# Patient Record
(demographics unavailable — no encounter records)

---

## 2024-10-09 NOTE — HISTORY OF PRESENT ILLNESS
[Breast milk] : breast milk [Formula ___ oz/feed] : [unfilled] oz of formula per feed [Normal] : Normal [In Bassinet/Crib] : sleeps in bassinet/crib [Pacifier use] : Pacifier use [No] : No cigarette smoke exposure [Water heater temperature set at <120 degrees F] : Water heater temperature set at <120 degrees F [Rear facing car seat in back seat] : Rear facing car seat in back seat [Carbon Monoxide Detectors] : Carbon monoxide detectors at home [Smoke Detectors] : Smoke detectors at home. [NO] : No [At risk for exposure to TB] : Not at risk for exposure to Tuberculosis  [de-identified] : refuses bottle [FreeTextEntry1] : doing well recent URI all ok

## 2024-10-09 NOTE — PHYSICAL EXAM
[Alert] : alert [Normocephalic] : normocephalic [Flat Open Anterior [place]] : flat open anterior fontanelle [PERRL] : PERRL [Red Reflex Bilateral] : red reflex bilateral [Normally Placed Ears] : normally placed ears [Auricles Well Formed] : auricles well formed [Clear Tympanic membranes] : clear tympanic membranes [Light reflex present] : light reflex present [Bony landmarks visible] : bony landmarks visible [Nares Patent] : nares patent [Palate Intact] : palate intact [Uvula Midline] : uvula midline [Supple, full passive range of motion] : supple, full passive range of motion [Symmetric Chest Rise] : symmetric chest rise [Clear to Auscultation Bilaterally] : clear to auscultation bilaterally [Regular Rate and Rhythm] : regular rate and rhythm [S1, S2 present] : S1, S2 present [+2 Femoral Pulses] : +2 femoral pulses [Soft] : soft [Bowel Sounds] : bowel sounds present [Normal external genitalia] : normal external genitalia [Central Urethral Opening] : central urethral opening [Testicles Descended Bilaterally] : testicles descended bilaterally [Normally Placed] : normally placed [No Abnormal Lymph Nodes Palpated] : no abnormal lymph nodes palpated [Symmetric Flexed Extremities] : symmetric flexed extremities [Startle Reflex] : startle reflex present [Suck Reflex] : suck reflex present [Rooting] : rooting reflex present [Palmar Grasp] : palmar grasp reflex present [Plantar Grasp] : plantar grasp reflex present [Symmetric Zeyad] : symmetric [place] [Acute Distress] : no acute distress [Discharge] : no discharge [Palpable Masses] : no palpable masses [Murmurs] : no murmurs [Tender] : nontender [Distended] : not distended [Hepatomegaly] : no hepatomegaly [Splenomegaly] : no splenomegaly [Monsalve-Ortolani] : negative Monsalve-Ortolani [Spinal Dimple] : no spinal dimple [Tuft of Hair] : no tuft of hair [Rash and/or lesion present] : no rash/lesion

## 2024-10-09 NOTE — DISCUSSION/SUMMARY
[Normal Growth] : growth [Normal Development] : development  [No Elimination Concerns] : elimination [Continue Regimen] : feeding [No Skin Concerns] : skin [Normal Sleep Pattern] : sleep [None] : no medical problems [Anticipatory Guidance Given] : Anticipatory guidance addressed as per the history of present illness section [Parental (Maternal) Well-Being] : parental (maternal) well-being [Infant-Family Synchrony] : infant-family synchrony [Nutritional Adequacy] : nutritional adequacy [Infant Behavior] : infant behavior [Safety] : safety [Age Approp Vaccines] : Age appropriate vaccines administered [No Medications] : ~He/She~ is not on any medications [Parent/Guardian] : Parent/Guardian [] : The components of the vaccine(s) to be administered today are listed in the plan of care. The disease(s) for which the vaccine(s) are intended to prevent and the risks have been discussed with the caretaker.  The risks are also included in the appropriate vaccination information statements which have been provided to the patient's caregiver.  The caregiver has given consent to vaccinate. [FreeTextEntry1] : Discussed safety/feeding/sleep as appropriate for age.  Time allowed for questions and all answered with understanding.   [place] - RSV preventive antibody administered.Nirsevimab is an immunization that provides antibodies directly to the infant. Most common side effect is temporary pain, redness at injection site.  MPA - managed doing OK  se back 1 mos

## 2024-11-08 NOTE — DISCUSSION/SUMMARY
[Normal Growth] : growth [Normal Development] : development  [No Elimination Concerns] : elimination [Continue Regimen] : feeding [No Skin Concerns] : skin [Normal Sleep Pattern] : sleep [None] : no medical problems [Anticipatory Guidance Given] : Anticipatory guidance addressed as per the history of present illness section [Family Functioning] : family functioning [Nutritional Adequacy and Growth] : nutritional adequacy and growth [Infant Development] : infant development [Oral Health] : oral health [Safety] : safety [Age Approp Vaccines] : Age appropriate vaccines administered [No Medications] : ~He/She~ is not on any medications [Parent/Guardian] : Parent/Guardian [] : The components of the vaccine(s) to be administered today are listed in the plan of care. The disease(s) for which the vaccine(s) are intended to prevent and the risks have been discussed with the caretaker.  The risks are also included in the appropriate vaccination information statements which have been provided to the patient's caregiver.  The caregiver has given consent to vaccinate. [FreeTextEntry1] : Discussed safety/feeding/sleep as appropriate for age.  Time allowed for questions and all answered with understanding.  Developmental screening Tool reviewed and discussed with parent. The child is developing normally. There are no delays in speech, gross motor, fine motor and socialization skills. SWYC for age  Postpartum depression screen reviewed and discussed with mom. Currently low risk for postpartum depression. Discussed signs and symptoms of depression with mom.  add solids MPA in control no Nutramigen BM  only

## 2024-11-08 NOTE — DEVELOPMENTAL MILESTONES
[Normal Development] : Normal Development [None] : none [Laughs aloud] : laughs aloud [Turns to voice] : turns to voice [Vocalizes with extending cooing] : vocalizes with extending cooing [Rolls over prone to supine] : rolls over prone to supine [Supports on elbows & wrists in prone] : supports on elbows and wrists in prone [Plays with fingers in midline] : plays with fingers in midline [Passed] : passed [Yes] : Completed. [FreeTextEntry2] : 0

## 2024-11-08 NOTE — HISTORY OF PRESENT ILLNESS
[Well-balanced] : well-balanced [Formula ___ oz/feed] : [unfilled] oz of formula per feed [Normal] : Normal [No] : No cigarette smoke exposure [Water heater temperature set at <120 degrees F] : Water heater temperature set at <120 degrees F [Rear facing car seat in back seat] : Rear facing car seat in back seat [Carbon Monoxide Detectors] : Carbon monoxide detectors at home [Smoke Detectors] : Smoke detectors at home. [Parents] : parents [Breast milk] : breast milk [Vitamins ___] : Patient takes [unfilled] vitamins daily [In Bassinet/Crib] : sleeps in bassinet/crib [On back] : sleeps on back [Co-sleeping] : co-sleeping [Loose bedding, pillow, toys, and/or bumpers in crib] : loose bedding, pillow, toys, and/or bumpers in crib [Pacifier use] : Pacifier use [NO] : No [FreeTextEntry1] : doing well

## 2024-11-08 NOTE — PHYSICAL EXAM
[Alert] : alert [Normocephalic] : normocephalic [Flat Open Anterior [place]] : flat open anterior fontanelle [Red Reflex] : red reflex bilateral [PERRL] : PERRL [Normally Placed Ears] : normally placed ears [Auricles Well Formed] : auricles well formed [Clear Tympanic membranes] : clear tympanic membranes [Light reflex present] : light reflex present [Bony landmarks visible] : bony landmarks visible [Nares Patent] : nares patent [Palate Intact] : palate intact [Uvula Midline] : uvula midline [Symmetric Chest Rise] : symmetric chest rise [Clear to Auscultation Bilaterally] : clear to auscultation bilaterally [Regular Rate and Rhythm] : regular rate and rhythm [S1, S2 present] : S1, S2 present [+2 Femoral Pulses] : (+) 2 femoral pulses [Soft] : soft [Bowel Sounds] : bowel sounds present [Central Urethral Opening] : central urethral opening [Testicles Descended] : testicles descended bilaterally [Patent] : patent [Normally Placed] : normally placed [No Abnormal Lymph Nodes Palpated] : no abnormal lymph nodes palpated [Startle Reflex] : startle reflex present [Plantar Grasp] : plantar grasp reflex present [Symmetric Zeyad] : symmetric zeyad [Acute Distress] : no acute distress [Discharge] : no discharge [Palpable Masses] : no palpable masses [Murmurs] : no murmurs [Tender] : nontender [Distended] : nondistended [Hepatomegaly] : no hepatomegaly [Splenomegaly] : no splenomegaly [Monsalve-Ortolani] : negative Monsalve-Ortolani [Allis Sign] : negative Allis sign [Spinal Dimple] : no spinal dimple [Tuft of Hair] : no tuft of hair [Rash or Lesions] : no rash/lesions

## 2024-12-06 NOTE — DISCUSSION/SUMMARY
[FreeTextEntry1] : Recommend supportive care including antipyretics, fluids, OTC cough/cold medications if age-appropriate, and nasal saline followed by nasal suction. Return if symptoms worsen or persist.  possible persistent MPA due to soy intake try to eliminate MILK and Soy from diet

## 2024-12-06 NOTE — PHYSICAL EXAM
[Alert] : alert [Playful] : playful [Clear] : right tympanic membrane clear [Clear Rhinorrhea] : clear rhinorrhea [NL] : warm, clear

## 2024-12-06 NOTE — HISTORY OF PRESENT ILLNESS
[de-identified] : cough x 2 days [FreeTextEntry6] : 5 mos old with MPA RSV vaccinated Nursed poops still seems mucus per mom not eliminating Soy

## 2024-12-09 NOTE — PHYSICAL EXAM
[Alert] : alert [Normocephalic] : normocephalic [Flat Open Anterior [place]] : flat open anterior fontanelle [Red Reflex] : red reflex bilateral [PERRL] : PERRL [Normally Placed Ears] : normally placed ears [Auricles Well Formed] : auricles well formed [Clear Tympanic membranes] : clear tympanic membranes [Light reflex present] : light reflex present [Bony landmarks visible] : bony landmarks visible [Nares Patent] : nares patent [Palate Intact] : palate intact [Uvula Midline] : uvula midline [Symmetric Chest Rise] : symmetric chest rise [Clear to Auscultation Bilaterally] : clear to auscultation bilaterally [Regular Rate and Rhythm] : regular rate and rhythm [S1, S2 present] : S1, S2 present [+2 Femoral Pulses] : (+) 2 femoral pulses [Soft] : soft [Bowel Sounds] : bowel sounds present [Central Urethral Opening] : central urethral opening [Testicles Descended] : testicles descended bilaterally [Patent] : patent [Normally Placed] : normally placed [No Abnormal Lymph Nodes Palpated] : no abnormal lymph nodes palpated [Startle Reflex] : startle reflex present [Plantar Grasp] : plantar grasp reflex present [Symmetric Zeyad] : symmetric zeyad [Acute Distress] : no acute distress [Discharge] : discharge [Palpable Masses] : no palpable masses [Murmurs] : no murmurs [Tender] : nontender [Distended] : nondistended [Hepatomegaly] : no hepatomegaly [Splenomegaly] : no splenomegaly [Monsalve-Ortolani] : negative Monsalve-Ortolani [Allis Sign] : negative Allis sign [Spinal Dimple] : no spinal dimple [Tuft of Hair] : no tuft of hair [Rash or Lesions] : no rash/lesions [FreeTextEntry4] : clear runny nose

## 2024-12-09 NOTE — DEVELOPMENTAL MILESTONES
[Normal Development] : Normal Development [None] : none [Laughs aloud] : laughs aloud [Turns to voice] : turns to voice [Vocalizes with extending cooing] : vocalizes with extending cooing [Rolls over prone to supine] : rolls over prone to supine [Supports on elbows & wrists in prone] : supports on elbows and wrists in prone [Plays with fingers in midline] : plays with fingers in midline

## 2024-12-09 NOTE — DISCUSSION/SUMMARY
[Normal Growth] : growth [Normal Development] : development  [No Elimination Concerns] : elimination [Continue Regimen] : feeding [No Skin Concerns] : skin [Normal Sleep Pattern] : sleep [None] : no medical problems [Anticipatory Guidance Given] : Anticipatory guidance addressed as per the history of present illness section [Family Functioning] : family functioning [Nutritional Adequacy and Growth] : nutritional adequacy and growth [Infant Development] : infant development [Oral Health] : oral health [Safety] : safety [Age Approp Vaccines] : Age appropriate vaccines administered [No Medications] : ~He/She~ is not on any medications [Parent/Guardian] : Parent/Guardian [] : The components of the vaccine(s) to be administered today are listed in the plan of care. The disease(s) for which the vaccine(s) are intended to prevent and the risks have been discussed with the caretaker.  The risks are also included in the appropriate vaccination information statements which have been provided to the patient's caregiver.  The caregiver has given consent to vaccinate. [FreeTextEntry1] : Discussed safety/feeding/sleep as appropriate for age.  Time allowed for questions and all answered with understanding.  resolving URI Recommend supportive care including antipyretics, fluids, OTC cough/cold medications if age-appropriate, and nasal saline followed by nasal suction. Return if symptoms worsen or persist.  MPA - breast fed, to now avoid Soy as per prior visit

## 2024-12-09 NOTE — HISTORY OF PRESENT ILLNESS
[Well-balanced] : well-balanced [Breast milk] : breast milk [Vitamins ___] : Patient takes [unfilled] vitamins daily [Normal] : Normal [No] : No cigarette smoke exposure [Water heater temperature set at <120 degrees F] : Water heater temperature set at <120 degrees F [Rear facing car seat in back seat] : Rear facing car seat in back seat [Carbon Monoxide Detectors] : Carbon monoxide detectors at home [Smoke Detectors] : Smoke detectors at home. [Father] : father [FreeTextEntry1] : doing well recent URI

## 2025-01-17 NOTE — PHYSICAL EXAM
[Alert] : alert [Normocephalic] : normocephalic [Flat Open Anterior [place]] : flat open anterior fontanelle [Red Reflex] : red reflex bilateral [PERRL] : PERRL [Normally Placed Ears] : normally placed ears [Auricles Well Formed] : auricles well formed [Clear Tympanic membranes] : clear tympanic membranes [Light reflex present] : light reflex present [Bony landmarks visible] : bony landmarks visible [Nares Patent] : nares patent [Palate Intact] : palate intact [Uvula Midline] : uvula midline [Supple, full passive range of motion] : supple, full passive range of motion [Symmetric Chest Rise] : symmetric chest rise [Clear to Auscultation Bilaterally] : clear to auscultation bilaterally [Regular Rate and Rhythm] : regular rate and rhythm [S1, S2 present] : S1, S2 present [+2 Femoral Pulses] : (+) 2 femoral pulses [Soft] : soft [Bowel Sounds] : bowel sounds present [Central Urethral Opening] : central urethral opening [Testicles Descended] : testicles descended bilaterally [Patent] : patent [Normally Placed] : normally placed [No Abnormal Lymph Nodes Palpated] : no abnormal lymph nodes palpated [Symmetric Buttocks Creases] : symmetric buttocks creases [Plantar Grasp] : plantar grasp reflex present [Cranial Nerves Grossly Intact] : cranial nerves grossly intact [Acute Distress] : no acute distress [Discharge] : no discharge [Tooth Eruption] : no tooth eruption [Palpable Masses] : no palpable masses [Murmurs] : no murmurs [Tender] : nontender [Distended] : nondistended [Hepatomegaly] : no hepatomegaly [Splenomegaly] : no splenomegaly [Monsalve-Ortolani] : negative Monsalve-Ortolani [Allis Sign] : negative Allis sign [Spinal Dimple] : no spinal dimple [Tuft of Hair] : no tuft of hair [Rash or Lesions] : no rash/lesions

## 2025-01-17 NOTE — DEVELOPMENTAL MILESTONES
[Normal Development] : Normal Development [None] : none [Pats or smiles at reflection] : pats or smiles at reflection [Rolls over prone to supine] : rolls over prone to supine [Sits briefly without support] : sits briefly without support [Reaches for object and transfers] : reaches for object and transfers [Rakes small object with 4 fingers] : rakes small object with 4 fingers [[place] small object on surface] : bangs small object on surface [Yes] : Completed.

## 2025-01-17 NOTE — HISTORY OF PRESENT ILLNESS
[Mother] : mother [Well-balanced] : well-balanced [Breast milk] : breast milk [Vitamins ___] : Patient takes [unfilled] vitamins daily [Normal] : Normal [No] : No cigarette smoke exposure [Water heater temperature set at <120 degrees F] : Water heater temperature set at <120 degrees F [Rear facing car seat in back seat] : Rear facing car seat in back seat [Carbon Monoxide Detectors] : Carbon monoxide detectors at home [Smoke Detectors] : Smoke detectors at home. [NO] : No [Father] : father [In Bassinet/Crib] : sleeps in bassinet/crib [Tummy time] : tummy time [FreeTextEntry1] : doing well POOR SLEEP WILL ONLY NURSE STARTING SOLIDS

## 2025-01-17 NOTE — DISCUSSION/SUMMARY
[Normal Growth] : growth [Normal Development] : development [None] : No medical problems [No Elimination Concerns] : elimination [No Feeding Concerns] : feeding [No Skin Concerns] : skin [Normal Sleep Pattern] : sleep [Family Functioning] : family functioning [Nutrition and Feeding] : nutrition and feeding [Infant Development] : infant development [Oral Health] : oral health [Safety] : safety [No Medications] : ~He/She~ is not on any medications [Parent/Guardian] : parent/guardian [] : The components of the vaccine(s) to be administered today are listed in the plan of care. The disease(s) for which the vaccine(s) are intended to prevent and the risks have been discussed with the caretaker.  The risks are also included in the appropriate vaccination information statements which have been provided to the patient's caregiver.  The caregiver has given consent to vaccinate. [FreeTextEntry1] : Discussed safety/feeding/sleep as appropriate for age.  Time allowed for questions and all answered with understanding.  Developmental screening Tool reviewed and discussed with parent. The child is developing normally. There are no delays in speech, gross motor, fine motor and socialization skills. SWYC for age  TB risk assessment completed - no risk for TB. PPD not required.  Oral Health Risk Assessment Tool used and reviewed. Child is deemed at low risk.  MPA - in control

## 2025-02-14 NOTE — DEVELOPMENTAL MILESTONES
[Normal Development] : Normal Development [None] : none [Pats or smiles at reflection] : pats or smiles at reflection [Begins to turn when name called] : begins to turn when name called [Babbles] : babbles [Rolls over prone to supine] : rolls over prone to supine [Sits briefly without support] : sits briefly without support [Rakes small object with 4 fingers] : rakes small object with 4 fingers [[place] small object on surface] : bangs small object on surface

## 2025-02-14 NOTE — PHYSICAL EXAM
[Alert] : alert [Normocephalic] : normocephalic [Flat Open Anterior [place]] : flat open anterior fontanelle [Red Reflex] : red reflex bilateral [PERRL] : PERRL [Normally Placed Ears] : normally placed ears [Auricles Well Formed] : auricles well formed [Clear Tympanic membranes] : clear tympanic membranes [Light reflex present] : light reflex present [Bony landmarks visible] : bony landmarks visible [Nares Patent] : nares patent [Palate Intact] : palate intact [Uvula Midline] : uvula midline [Supple, full passive range of motion] : supple, full passive range of motion [Symmetric Chest Rise] : symmetric chest rise [Clear to Auscultation Bilaterally] : clear to auscultation bilaterally [Regular Rate and Rhythm] : regular rate and rhythm [S1, S2 present] : S1, S2 present [+2 Femoral Pulses] : (+) 2 femoral pulses [Soft] : soft [Bowel Sounds] : bowel sounds present [Central Urethral Opening] : central urethral opening [Testicles Descended] : testicles descended bilaterally [Patent] : patent [Normally Placed] : normally placed [No Abnormal Lymph Nodes Palpated] : no abnormal lymph nodes palpated [Symmetric Buttocks Creases] : symmetric buttocks creases [Plantar Grasp] : plantar grasp reflex present [Cranial Nerves Grossly Intact] : cranial nerves grossly intact [Acute Distress] : no acute distress [Discharge] : no discharge [Tooth Eruption] : no tooth eruption [Palpable Masses] : no palpable masses [Murmurs] : no murmurs [Tender] : nontender [Distended] : nondistended [Hepatomegaly] : no hepatomegaly [Splenomegaly] : no splenomegaly [Monsalve-Ortolani] : negative Monsalve-Ortolani [Allis Sign] : negative Allis sign [Spinal Dimple] : no spinal dimple [Tuft of Hair] : no tuft of hair [Rash or Lesions] : no rash/lesions

## 2025-02-14 NOTE — DISCUSSION/SUMMARY
[Normal Growth] : growth [Normal Development] : development [None] : No medical problems [No Elimination Concerns] : elimination [No Feeding Concerns] : feeding [No Skin Concerns] : skin [Normal Sleep Pattern] : sleep [Family Functioning] : family functioning [Nutrition and Feeding] : nutrition and feeding [Infant Development] : infant development [Oral Health] : oral health [Safety] : safety [No Medications] : ~He/She~ is not on any medications [Parent/Guardian] : parent/guardian [] : The components of the vaccine(s) to be administered today are listed in the plan of care. The disease(s) for which the vaccine(s) are intended to prevent and the risks have been discussed with the caretaker.  The risks are also included in the appropriate vaccination information statements which have been provided to the patient's caregiver.  The caregiver has given consent to vaccinate. [FreeTextEntry1] : Discussed safety/feeding/sleep as appropriate for age.  Time allowed for questions and all answered with understanding.  MPA - in control  Atopic dermatitis is a common pediatric condition made worse by dry environment. The treatment involves using moisturizers such as cetaphil, aquaphor,or cereve. Moisturizing soaps such as Dove or Cetaphil can also be used. And, at times, steroids creams for 7-10 days. Moisturizers should be applied after bathing and even before the skin is totally dry to allow moisture to remain in the skin. RTO at 9 mos

## 2025-03-17 NOTE — PHYSICAL EXAM
[Alert] : alert [Playful] : playful [Clear] : right tympanic membrane clear [Clear Rhinorrhea] : clear rhinorrhea [Crackles] : crackles [NL] : warm, clear [No Acute Distress] : no acute distress [Discharge] : no discharge [Conjunctival Injection] : no conjunctival injection [Erythematous Oropharynx] : nonerythematous oropharynx [Wheezing] : no wheezing [Tachypnea] : no tachypnea [Belly Breathing] : no belly breathing [Subcostal Retractions] : no subcostal retractions [Suprasternal Retractions] : no suprasternal retractions

## 2025-03-17 NOTE — HISTORY OF PRESENT ILLNESS
[de-identified] : fever congestion cough [FreeTextEntry6] : over past 4 days has had episodic fevers has cough and congestion no respiratory distress noted ear tugging present good oral intake

## 2025-03-17 NOTE — REVIEW OF SYSTEMS
[Fever] : fever [Ear Tugging] : ear tugging [Nasal Discharge] : nasal discharge [Nasal Congestion] : nasal congestion [Cough] : cough [Negative] : Genitourinary [Eye Discharge] : no eye discharge [Eye Redness] : no eye redness [Tachypnea] : not tachypneic [Wheezing] : no wheezing

## 2025-03-24 NOTE — PHYSICAL EXAM
[Well-appearing] : well-appearing [Normocephalic] : normocephalic [Anterior fontanel- Open] : anterior fontanel- open [Anterior fontanel- Soft] : anterior fontanel- soft [Anterior fontanel- Flat] : anterior fontanel- flat [No ocular abnormalities] : no ocular abnormalities [No abnormal neurocutaneous stigmata or skin lesions] : no abnormal neurocutaneous stigmata or skin lesions [Straight] : straight [No deformities] : no deformities [Alert] : alert [Regards] : regards [Smiling] : smiling [Cooing] : cooing [Pupils reactive to light] : pupils reactive to light [Turns to light] : turns to light [Tracks face, light or objects with full extraocular movements] : tracks face, light or objects with full extraocular movements [No facial asymmetry or weakness] : no facial asymmetry or weakness [No nystagmus] : no nystagmus [Responds to voice/sounds] : responds to voice/sounds [No fasciculations] : no fasciculations [Normal axial and appendicular muscle tone with symmetric limb movements] : normal axial and appendicular muscle tone with symmetric limb movements [Sits without support] : sits without support [Stands holding on] : stands holding on [No abnormal involuntary movements] : no abnormal involuntary movements [2+ biceps] : 2+ biceps [Knee jerks] : knee jerks [Good sitting balance] : good sitting balance [de-identified] : awake, alert, in NAD [de-identified] : metopic suture ridge palpable between the anterior fontanelle and the forehead line and less palpable as it is running in the middle of the forehead toward the bridge of the nose; no facial asymmetry; non specific bony ridge at the region of the posterior fontanelle palpable as well

## 2025-03-24 NOTE — CONSULT LETTER
[Dear  ___] : Dear  [unfilled], [Consult Letter:] : I had the pleasure of evaluating your patient, [unfilled]. [Please see my note below.] : Please see my note below. [Consult Closing:] : Thank you very much for allowing me to participate in the care of this patient.  If you have any questions, please do not hesitate to contact me. [Sincerely,] : Sincerely, [FreeTextEntry3] : Elvi Sanchez M.D Pediatric neurology attending Neurofibromatosis clinic Co-director [facility] Tel: [phone] Fax: [phone]

## 2025-03-24 NOTE — DEVELOPMENTAL MILESTONES
[Passes objects] : passes objects [Rakes objects] : rakes objects [Abdi/Mama non-specific] : abdi/mama non-specific [Turns to voices] : turns to voices [Sit - no support, leaning forward] : sit - no support, leaning forward [Pulls to sit - no head lag] : pulls to sit - no head lag [Roll over] : roll over [Play pat-a-cake] : play pat-a-cake [Pull to stand] : pull to stand [Stands holding on] : stands holding on

## 2025-03-24 NOTE — ASSESSMENT
[FreeTextEntry1] : 8 months old boy, recurrent falls; mother has concerns about bumps on the skull; reaching all milestones; on exam he has a metopic ridge palpable and a bony ridge at the area of the posterior fontanelle; otherwise non focal exam  [] informed mother that this is likely the way that the suture closed; however, it is not affecting him cosmetically, his head is roundly shaped and HC continues to grow appropriately on the 50-75%, therefore there is no clinical indication for any surgical intervention; I also advised mother that it is not related to the falls [] reviewed with mother anticipatory guidance; explained need for constant supervision and not to leave him unattended to prevent further falls [] monitor the ridges; if any worsening or new concerns, consider NSx consultation

## 2025-03-24 NOTE — HISTORY OF PRESENT ILLNESS
[FreeTextEntry1] : ORA was seen here on 8/20/24, when he was 47 days old for twitching.  HUS 8/28/24 normal Routine EEG 9/9/24 normal ___________________  03/24/2025  follow up Above reviewed with mother; Mother reports ORA is doing well; she is concerned because he fell few times and hit his head strongly; he fell out of bed few times and also when he started crawling. He has few bumps that he did not have before. Mother is specifically concerned about a bump in the center just above the forehead and another one in the back of the head.  The last time he fell was about 3 weeks ago. As per mother, ORA fell 6-7 times since he was born. She is not sure how many times exactly he fell when he was with his father. As per mother, no LOC with falls, once he gasped. Parents never brought him to the ED because of the falls. PMD is not aware of all the falls; sometimes mother consulted with PMD about it.  ORA lives with mother; parents are not living together (each parent lives with their respective parents); therefore, mother did not know how many times he fell with father and therefore she wanted him checked out. Developmental milestones: reaching all milestones

## 2025-03-31 NOTE — DISCUSSION/SUMMARY
[FreeTextEntry1] : Viral syndrome most likely. Physiologic nature of fever explained. Reviewed proper doses of acetaminophen and ibuprofen. Provided with guidance as to when to call or return to office.  Recommend supportive care including antipyretics, fluids, OTC cough/cold medications if age-appropriate, and nasal saline followed by nasal suction. Return if symptoms worsen or persist.

## 2025-03-31 NOTE — HISTORY OF PRESENT ILLNESS
[de-identified] : fever [FreeTextEntry6] : 8 mos old with cough and nasal congestion T max 101.6 x 2 days in Day Care

## 2025-03-31 NOTE — PHYSICAL EXAM
[Alert] : alert [Playful] : playful [Clear] : right tympanic membrane clear [Clear Rhinorrhea] : clear rhinorrhea [NL] : warm, clear [FreeTextEntry1] : wet cough

## 2025-04-11 NOTE — DISCUSSION/SUMMARY
[Normal Growth] : growth [Normal Development] : development [None] : No known medical problems [No Elimination Concerns] : elimination [No Feeding Concerns] : feeding [No Skin Concerns] : skin [Normal Sleep Pattern] : sleep [Family Adaptation] : family adaptation [Infant Edgar] : infant independence [Feeding Routine] : feeding routine [Safety] : safety [No Medications] : ~He/She~ is not on any medications [Parent/Guardian] : parent/guardian [FreeTextEntry1] : Discussed safety/feeding/sleep as appropriate for age.  Time allowed for questions and all answered with understanding.  Continue breastmilk or formula as desired. Increase table foods, 3 meals with 2-3 snacks per day. Incorporate up to 6 oz of flourinated water daily in a sippy cup. Discussed weaning of bottle and pacifier. Wipe teeth daily with washcloth. When in car, patient should be in rear-facing car seat in back seat. Put baby to sleep in own crib with no loose or soft bedding. Lower crib matress. Help baby to maintain consistent daily routines and sleep schedule. Recognize stranger anxiety. Ensure home is safe since baby is increasingly mobile. Be within arm's reach of baby at all times. Use consistent, positive discipline. Avoid screen time. Read aloud to baby.  Developmental screening Tool reviewed and discussed with parent. The child is developing normally. There are no delays in speech, gross motor, fine motor and socialization skills. SWYC for age  MPA  -in control  Atopic dermatitis is a common pediatric condition made worse by dry environment. The treatment involves using moisturizers such as cetaphil, aquaphor,or cereve. Moisturizing soaps such as Dove or Cetaphil can also be used. And, at times, steroids creams for 7-10 days. Moisturizers should be applied after bathing and even before the skin is totally dry to allow moisture to remain in the skin. Consider All Sensitive Clear as detergent Run Humidifier. Avoid all scented products.  head ok seen by neurology  Oral Health Risk Assessment Tool used and reviewed. Child is deemed at low risk. RTO at 12 mos

## 2025-04-11 NOTE — DEVELOPMENTAL MILESTONES
[Normal Development] : Normal Development [None] : none [Uses basic gestures] : uses basic gestures [Says "Abdi" or "Mama"] : says "Abdi" or "Mama" nonspecifically [Sits well without support] : sits well without support [Transitions between sitting and lying] : transitions between sitting and lying [Balances on hands and knees] : balances on hands and knees [Crawls] : crawls [Picks up small objects with 3 fingers] : picks up small objects with 3 fingers and thumb [[place] objects together] : bangs objects together [Yes] : Completed.

## 2025-04-11 NOTE — HISTORY OF PRESENT ILLNESS
[Mother] : mother [Well-balanced] : well-balanced [Breast milk] : breast milk [Vitamin ___] : Patient takes [unfilled] vitamins daily [Baby food] : baby food [Normal] : Normal [In Crib] : sleeps in crib [Pacifier use] : Pacifier use [Brushing teeth] : brushing teeth [Vitamin] : Primary Fluoride Source: Vitamin [No] : No cigarette smoke exposure [Water heater temperature set at <120 degrees F] : Water heater temperature set at <120 degrees F [Rear facing car seat in  back seat] : Rear facing car seat in  back seat [Carbon Monoxide Detectors] : Carbon monoxide detectors [Smoke Detectors] : Smoke detectors [Up to date] : Up to date [NO] : No [FreeTextEntry1] : doing well

## 2025-04-11 NOTE — PHYSICAL EXAM
[Alert] : alert [Normocephalic] : normocephalic [Flat Open Anterior [place]] : flat open anterior fontanelle [Red Reflex] : red reflex bilateral [PERRL] : PERRL [Normally Placed Ears] : normally placed ears [Auricles Well Formed] : auricles well formed [Clear Tympanic membranes] : clear tympanic membranes [Light reflex present] : light reflex present [Bony landmarks visible] : bony landmarks visible [Nares Patent] : nares patent [Palate Intact] : palate intact [Uvula Midline] : uvula midline [Supple, full passive range of motion] : supple, full passive range of motion [Symmetric Chest Rise] : symmetric chest rise [Clear to Auscultation Bilaterally] : clear to auscultation bilaterally [Regular Rate and Rhythm] : regular rate and rhythm [S1, S2 present] : S1, S2 present [+2 Femoral Pulses] : (+) 2 femoral pulses [Soft] : soft [Bowel Sounds] : bowel sounds present [Central Urethral Opening] : central urethral opening [Testicles Descended] : testicles descended bilaterally [No Abnormal Lymph Nodes Palpated] : no abnormal lymph nodes palpated [Symmetric Abduction and Rotation of hips] : symmetric abduction and rotation of hips [Straight] : straight [Cranial Nerves Grossly Intact] : cranial nerves grossly intact [Acute Distress] : no acute distress [Excessive Tearing] : no excessive tearing [Discharge] : no discharge [Palpable Masses] : no palpable masses [Murmurs] : no murmurs [Tender] : nontender [Distended] : nondistended [Hepatomegaly] : no hepatomegaly [Splenomegaly] : no splenomegaly [Allis Sign] : negative Allis sign [Rash or Lesions] : no rash/lesions

## 2025-06-06 NOTE — DISCUSSION/SUMMARY
[FreeTextEntry1] : viral entity physiologic nature of fever discussed. reviewed proper dosages of acetaminophen and ibuprofen as well as provided guidance as to when to call or return to office. Use humidifier, saline nasal drops, encourage fluids and fever control as needed. Elevate head of bed. Return for spiking fever, worsening symptoms, respiratory distress or concerns.

## 2025-06-06 NOTE — HISTORY OF PRESENT ILLNESS
[de-identified] : congested tolerating po intake/nursing [FreeTextEntry6] : fever congested past 2 days  fussy last tylenol 3am

## 2025-07-11 NOTE — HISTORY OF PRESENT ILLNESS
[Mother] : mother [Normal] : Normal [No] : No cigarette smoke exposure [Water heater temperature set at <120 degrees F] : Water heater temperature set at <120 degrees F [Car seat in back seat] : Car seat in back seat [Smoke Detectors] : Smoke detectors [Carbon Monoxide Detectors] : Carbon monoxide detectors [Up to date] : Up to date [NO] : No [Breast milk] : breast milk [Finger food] : finger food [Table food] : table food [At risk for exposure to TB] : Not at risk for exposure to Tuberculosis [de-identified] : MPA S/P [FreeTextEntry1] : DOING WELL

## 2025-07-11 NOTE — DISCUSSION/SUMMARY
[Normal Growth] : growth [Normal Development] : development [None] : No known medical problems [No Elimination Concerns] : elimination [No Feeding Concerns] : feeding [No Skin Concerns] : skin [Normal Sleep Pattern] : sleep [Family Support] : family support [Establishing Routines] : establishing routines [Feeding and Appetite Changes] : feeding and appetite changes [Establishing A Dental Home] : establishing a dental home [Safety] : safety [No Medications] : ~He/She~ is not on any medications [Parent/Guardian] : parent/guardian [] : The components of the vaccine(s) to be administered today are listed in the plan of care. The disease(s) for which the vaccine(s) are intended to prevent and the risks have been discussed with the caretaker.  The risks are also included in the appropriate vaccination information statements which have been provided to the patient's caregiver.  The caregiver has given consent to vaccinate. [FreeTextEntry1] : Discussed safety/feeding/sleep as appropriate for age.  Time allowed for questions and all answered with understanding. Developmental screening Tool reviewed and discussed with parent. The child is developing normally. There are no delays in speech, gross motor, fine motor and socialization skills. SWYC for age TB risk assessment completed - no risk for TB. PPD not required.  Oral Health Risk Assessment Tool used and reviewed. Child is deemed at low risk.  MPA SHOULD BE OUTGROWN  Atopic dermatitis is a common pediatric condition made worse by dry environment. The treatment involves using moisturizers such as cetaphil, aquaphor,or cereve. Moisturizing soaps such as Dove or Cetaphil can also be used. And, at times, steroids creams for 7-10 days. Moisturizers should be applied after bathing and even before the skin is totally dry to allow moisture to remain in the skin. Consider All Sensitive Clear as detergent Run Humidifier. Avoid all scented products.  Transition to whole cow's milk. Continue table foods, 3 meals with 2-3 snacks per day. Incorporate up to 6 oz of flourinated water daily in a sippy cup. Brush teeth twice a day with soft toothbrush. Recommend visit to dentist. When in car, keep child in rear-facing car seats until age 2, or until  the maximum height and weight for seat is reached. Put baby to sleep in own crib with no loose or soft bedding. Lower crib matress. Help baby to maintain consistent daily routines and sleep schedule. Recognize stranger and separation anxiety. Ensure home is safe since baby is increasingly mobile. Be within arm's reach of baby at all times. Use consistent, positive discipline. Avoid screen time. Read aloud to baby.

## 2025-07-11 NOTE — PHYSICAL EXAM
[Alert] : alert [Normocephalic] : normocephalic [Closed Anterior [place]] : closed anterior fontanelle [Red Reflex] : red reflex bilateral [PERRL] : PERRL [Normally Placed Ears] : normally placed ears [Auricles Well Formed] : auricles well formed [Clear Tympanic membranes] : clear tympanic membranes [Light reflex present] : light reflex present [Bony landmarks visible] : bony landmarks visible [Nares Patent] : nares patent [Palate Intact] : palate intact [Uvula Midline] : uvula midline [Tooth Eruption] : tooth eruption [Supple, full passive range of motion] : supple, full passive range of motion [Symmetric Chest Rise] : symmetric chest rise [Clear to Auscultation Bilaterally] : clear to auscultation bilaterally [Regular Rate and Rhythm] : regular rate and rhythm [S1, S2 present] : S1, S2 present [+2 Femoral Pulses] : (+) 2 femoral pulses [Soft] : soft [Bowel Sounds] : normoactive bowel sounds [Central Urethral Opening] : central urethral opening [Testicles Descended] : testicles descended bilaterally [No Abnormal Lymph Nodes Palpated] : no abnormal lymph nodes palpated [Symmetric Abduction and Rotation of Hips] : symmetric abduction and rotation of hips [Straight] : straight [Cranial Nerves Grossly Intact] : cranial nerves grossly intact [Discharge] : no discharge [Palpable Masses] : no palpable masses [Murmurs] : no murmurs [Tender] : nontender [Distended] : nondistended [Hepatomegaly] : no hepatomegaly [Splenomegaly] : no splenomegaly [Allis Sign] : negative Allis sign [Rash or Lesions] : rash and/or lesion present [de-identified] : FLEXURAL POPLITEAL ERYTHEMA

## 2025-07-11 NOTE — DEVELOPMENTAL MILESTONES
[Normal Development] : Normal Development [None] : none [Looks for hidden objects] : looks for hidden objects [Imitates new gestures] : imitates new gestures [Says "Dad" or "Mom" with meaning] : says "Dad" or "Mom" with meaning [Follows a verbal command that] : follows a verbal command that includes a gesture [Takes first independent] : takes first independent steps [Stands without support] : stands without support [Drops object in a cup] : drops object in a cup [Picks up small object with 2 finger] : picks up small object with 2 finger pincer grasp [Yes] : Completed. [Picks up food and eats it] : picks up food and eats it [FreeTextEntry1] : bm